# Patient Record
Sex: FEMALE | Race: BLACK OR AFRICAN AMERICAN | NOT HISPANIC OR LATINO | Employment: UNEMPLOYED | URBAN - METROPOLITAN AREA
[De-identification: names, ages, dates, MRNs, and addresses within clinical notes are randomized per-mention and may not be internally consistent; named-entity substitution may affect disease eponyms.]

---

## 2017-04-12 ENCOUNTER — OFFICE VISIT (OUTPATIENT)
Dept: FAMILY MEDICINE | Facility: HOSPITAL | Age: 57
End: 2017-04-12
Payer: MEDICAID

## 2017-04-12 VITALS
HEIGHT: 59 IN | SYSTOLIC BLOOD PRESSURE: 114 MMHG | DIASTOLIC BLOOD PRESSURE: 74 MMHG | WEIGHT: 172.81 LBS | BODY MASS INDEX: 34.84 KG/M2 | HEART RATE: 72 BPM

## 2017-04-12 DIAGNOSIS — I10 ESSENTIAL HYPERTENSION: ICD-10-CM

## 2017-04-12 DIAGNOSIS — Z76.89 ENCOUNTER TO ESTABLISH CARE: Primary | ICD-10-CM

## 2017-04-12 DIAGNOSIS — E11.8 TYPE 2 DIABETES MELLITUS WITH COMPLICATION, WITHOUT LONG-TERM CURRENT USE OF INSULIN: ICD-10-CM

## 2017-04-12 DIAGNOSIS — Z01.818 PREOPERATIVE CLEARANCE: ICD-10-CM

## 2017-04-12 PROCEDURE — 99203 OFFICE O/P NEW LOW 30 MIN: CPT | Performed by: FAMILY MEDICINE

## 2017-04-12 RX ORDER — METOPROLOL TARTRATE 100 MG/1
100 TABLET ORAL 2 TIMES DAILY
COMMUNITY

## 2017-04-12 RX ORDER — NAPROXEN SODIUM 220 MG/1
81 TABLET, FILM COATED ORAL DAILY
COMMUNITY

## 2017-04-12 RX ORDER — LISINOPRIL 40 MG/1
40 TABLET ORAL DAILY
COMMUNITY

## 2017-04-12 RX ORDER — ATORVASTATIN CALCIUM 80 MG/1
80 TABLET, FILM COATED ORAL DAILY
COMMUNITY

## 2017-04-12 RX ORDER — METFORMIN HYDROCHLORIDE 1000 MG/1
1000 TABLET ORAL 2 TIMES DAILY WITH MEALS
COMMUNITY

## 2017-04-12 RX ORDER — NITROGLYCERIN 0.4 MG/1
0.4 TABLET SUBLINGUAL EVERY 5 MIN PRN
COMMUNITY

## 2017-04-12 NOTE — LETTER
April 12, 2017                     [facility], [address]  [place] [address]  Phone: [phone]  Fax: [phone]   Patient: Mily Donnelly   MR Number: [id number]   YOB: 1960   Date of Visit: 4/12/2017     Dear [facility] (Margarito Contreras),     We were unable to offer pre-operative medical clearance for this patient today as we are not aware of the surgical procedure she is scheduled to receive. We would be happy to see her and evaluate again once this information has been determined. Thank you for your assistance.    Sincerely,      Ej Granado MD

## 2017-04-12 NOTE — PROGRESS NOTES
I assume primary medical responsibility for this patient, I have reviewed the case history, findings, diagnosis and treatment plan with the resident and agree that the care is reasonable and necessary. This service has been performed by a resident without the presence of a teaching physician under the primary care exception  Debi Salomon  4/12/2017

## 2017-04-12 NOTE — PROGRESS NOTES
"Subjective:       Patient ID: Mily Donnelly is a 57 y.o. female.    Chief Complaint: Pre-op Exam    HPI   Ms. Donnelly is a 56 yo  woman with PMHx of MI in  s/p stent, CHF with EF ~55%, HTN, DM2, Asthma, and "lung mass" who presents today to establish care and  obtain surgical clearance with referral from [facility]. Much of the pt's history obtained from pt and [facility] EMR. Pt had a heart attack in  and is followed by [facility] Cardiology, last seen on 3/2017 and at that time Plavix and Aldactone were discontinued d/t pt's preserved EF and low NYHA class. Pt previously following with [facility] Pulmonology, last appt was in  after which she was lost to follow-up. Pt still on Combivent. Prior to now, pt's PCP was Mary Carmen Calloway at [email or web address], however pt states that upon calling to make this appt, she was notified that Dr. Calloway is no longer her PCP, that it is Dr. Bermudez instead. Last month, pt's cardiologist referred her to [facility]NT d/t 1 year of intermittent voice hoarseness. Saw them on 4/3/2017 and CT Neck obtained on  demonstrated thickening of the vocal chords and mandibular cyst. Pt is scheduled to follow up with them this Monday, . Her MET score is > 4.  Pt has a 40-pack year hx, had quit for a year following her MI, however started back. She currently smokes 1 ppd though she states she is interested in resources to help her quit. She drinks 1 beer per week, denies any drug use. 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 She does not exercise and eats a typical [place] diet. Is sexually active with one man, denies use of protection.  Past family hx significant for DM & MI (Mom, ) and Aneurysm & HTN (Dad, ), no known family hx of CA.     Review of Systems   Constitutional: Negative for chills and fever.   HENT: Negative for rhinorrhea and sore throat.    Eyes: Negative for pain and redness.   Respiratory: " Positive for shortness of breath. Negative for cough.    Cardiovascular: Negative for chest pain and palpitations.   Gastrointestinal: Positive for diarrhea. Negative for abdominal pain, nausea and vomiting.   Endocrine: Negative for polydipsia and polyuria.   Genitourinary: Negative for dysuria and frequency.   Musculoskeletal: Negative for arthralgias and back pain.   Skin: Negative for rash.   Neurological: Positive for numbness. Negative for dizziness, light-headedness and headaches.   Hematological: Negative for adenopathy. Bruises/bleeds easily.   Psychiatric/Behavioral: Negative for confusion. The patient is not nervous/anxious.        Objective:      Vitals:    04/12/17 1319   BP: 114/74   Pulse: 72     Physical Exam   Constitutional: She is oriented to person, place, and time. She appears well-developed and well-nourished. No distress.   HENT:   Head: Normocephalic and atraumatic.   Mouth/Throat: Oropharynx is clear and moist.   Eyes: Conjunctivae and EOM are normal. Pupils are equal, round, and reactive to light.   Neck: Neck supple. No thyromegaly present.   Cardiovascular: Normal rate, regular rhythm and normal heart sounds.    No murmur heard.  Pulmonary/Chest: Effort normal and breath sounds normal. She has no wheezes.   Abdominal: Soft. Bowel sounds are normal. She exhibits no distension. There is no tenderness.   Musculoskeletal: Normal range of motion. She exhibits no edema or tenderness.   Lymphadenopathy:     She has no cervical adenopathy.   Neurological: She is alert and oriented to person, place, and time. No cranial nerve deficit.   Skin: Skin is warm and dry. No erythema.   Psychiatric: She has a normal mood and affect. Her behavior is normal. Thought content normal.       Assessment:       1. Encounter to establish care    2. Essential hypertension    3. Type 2 diabetes mellitus with complication, without long-term current use of insulin    4. Preoperative clearance        Plan:        Encounter to establish care  - Orders in for CBC, CMP, Lipids, and TSH  - Will obtain records from [facility] (previous PCP)    Essential hypertension  - 114/74 in clinic today  - No medication refills needed at this time    Type 2 diabetes mellitus with complication, without long-term current use of insulin  - Orders in for A1c    Preoperative clearance  - Patient with >4 METS with good functional capacity.  Proceed with surgery.    - Will forward EKG to [facility]

## 2017-04-19 ENCOUNTER — HOSPITAL ENCOUNTER (OUTPATIENT)
Dept: CARDIOLOGY | Facility: HOSPITAL | Age: 57
Discharge: HOME | End: 2017-04-19
Attending: FAMILY MEDICINE
Payer: MEDICAID

## 2017-04-19 ENCOUNTER — HOSPITAL ENCOUNTER (OUTPATIENT)
Dept: RADIOLOGY | Facility: HOSPITAL | Age: 57
Discharge: HOME | End: 2017-04-19
Attending: FAMILY MEDICINE
Payer: MEDICAID

## 2017-04-19 DIAGNOSIS — Z01.818 PREOPERATIVE CLEARANCE: ICD-10-CM

## 2017-04-19 PROCEDURE — 71020 XR CHEST PA AND LATERAL: CPT | Mod: TC

## 2017-04-19 PROCEDURE — 71020 XR CHEST PA AND LATERAL: CPT | Mod: 26,,, | Performed by: RADIOLOGY

## 2017-04-19 PROCEDURE — 93005 ELECTROCARDIOGRAM TRACING: CPT

## 2017-04-19 PROCEDURE — 93010 ELECTROCARDIOGRAM REPORT: CPT | Mod: ,,, | Performed by: INTERNAL MEDICINE

## 2017-05-15 ENCOUNTER — TELEPHONE (OUTPATIENT)
Dept: FAMILY MEDICINE | Facility: HOSPITAL | Age: 57
End: 2017-05-15

## 2017-05-15 NOTE — TELEPHONE ENCOUNTER
Received a call from Fouzia Fisher, nurse at [facility]. [facility] patient is scheduled to have a direct laryngoscopy with biopsy on 5/25/17. [facility] patient came in for surgery clearance and they need the clearance and EKG faxed to their office. The phone number to Ms. Fisher is [phone] and the fax is [phone].

## 2017-05-23 NOTE — TELEPHONE ENCOUNTER
Patient's surgery clearance completed by Dr. Granado. Clearance note and EKG faxed to Fouzia Fisher at [phone].

## 2018-01-12 ENCOUNTER — OFFICE VISIT (OUTPATIENT)
Dept: FAMILY MEDICINE | Facility: HOSPITAL | Age: 58
End: 2018-01-12
Payer: MEDICAID

## 2018-01-12 VITALS
DIASTOLIC BLOOD PRESSURE: 84 MMHG | SYSTOLIC BLOOD PRESSURE: 134 MMHG | HEART RATE: 79 BPM | HEIGHT: 59 IN | WEIGHT: 173.94 LBS | BODY MASS INDEX: 35.07 KG/M2

## 2018-01-12 DIAGNOSIS — I10 ESSENTIAL HYPERTENSION: Primary | ICD-10-CM

## 2018-01-12 PROCEDURE — 99213 OFFICE O/P EST LOW 20 MIN: CPT | Performed by: FAMILY MEDICINE

## 2018-01-12 RX ORDER — METOPROLOL SUCCINATE 100 MG/1
TABLET, EXTENDED RELEASE ORAL
COMMUNITY
Start: 2017-11-16

## 2018-01-12 RX ORDER — INSULIN PUMP SYRINGE, 3 ML
EACH MISCELLANEOUS
Qty: 1 EACH | Refills: 0 | Status: SHIPPED | OUTPATIENT
Start: 2018-01-12 | End: 2019-01-12

## 2018-01-22 NOTE — PROGRESS NOTES
Subjective:       Patient ID: Mily Donnelly is a 57 y.o. female.    Chief Complaint: Follow-up    Patient presents today for follow up of her hypertension.  Patient states that she needs a new glucometer.  She does not need refills at this time.  Negative for fever, headache, nausea, vomiting, chest pain, abdominal pain, dysuria, constipation, diarrhea        Review of Systems   Constitutional: Negative for chills and fever.   Respiratory: Negative for shortness of breath.    Cardiovascular: Negative for chest pain.   Gastrointestinal: Negative for abdominal pain, nausea and vomiting.   Neurological: Negative for headaches.       Objective:      Vitals:    01/12/18 1035   BP: 134/84   Pulse: 79     Physical Exam   Constitutional: She is oriented to person, place, and time. She appears well-developed and well-nourished. No distress.   HENT:   Head: Normocephalic and atraumatic.   Right Ear: External ear normal.   Left Ear: External ear normal.   Nose: Nose normal.   Mouth/Throat: Oropharynx is clear and moist. No oropharyngeal exudate.   Eyes: Conjunctivae and EOM are normal. Pupils are equal, round, and reactive to light. Right eye exhibits no discharge. Left eye exhibits no discharge. No scleral icterus.   Neck: Normal range of motion. No JVD present. No thyromegaly present.   Cardiovascular: Normal rate, regular rhythm, normal heart sounds and intact distal pulses.  Exam reveals no gallop and no friction rub.    No murmur heard.  Pulmonary/Chest: Effort normal and breath sounds normal. No respiratory distress. She has no wheezes. She has no rales. She exhibits no tenderness.   Abdominal: Soft. Bowel sounds are normal. She exhibits no distension and no mass. There is no tenderness. There is no rebound and no guarding. No hernia.   Musculoskeletal: Normal range of motion.   Neurological: She is alert and oriented to person, place, and time. No cranial nerve deficit.   Skin: Skin is warm and dry. She is not  diaphoretic.   Psychiatric: She has a normal mood and affect. Her behavior is normal.   Vitals reviewed.      Assessment:       1. Essential hypertension        Plan:       Essential hypertension  -     CBC auto differential; Future; Expected date: 01/12/2018  -     Comprehensive metabolic panel; Future; Expected date: 01/12/2018  -     Hemoglobin A1c; Future; Expected date: 01/12/2018  -     Lipid panel; Future; Expected date: 01/12/2018  -     TSH; Future; Expected date: 01/12/2018  -     blood-glucose meter kit; Use as instructed  Dispense: 1 each; Refill: 0      Follow-up in about 3 months (around 4/12/2018).

## 2018-01-24 NOTE — PROGRESS NOTES
I assume primary medical responsibility for this patient, I have reviewed the case history, findings, diagnosis and treatment plan with the resident and agree that the care is reasonable and necessary. This service has been performed by a resident without the presence of a teaching physician under the primary care exception  Debi Salomon  1/24/2018

## 2019-05-20 DIAGNOSIS — Z12.11 SCREENING FOR COLON CANCER: Primary | ICD-10-CM

## 2019-05-21 ENCOUNTER — LAB VISIT (OUTPATIENT)
Dept: LAB | Facility: HOSPITAL | Age: 59
End: 2019-05-21
Attending: FAMILY MEDICINE
Payer: MEDICAID

## 2019-05-21 DIAGNOSIS — Z12.11 SCREENING FOR COLON CANCER: ICD-10-CM

## 2019-05-21 LAB — HEMOCCULT STL QL IA: NEGATIVE

## 2019-05-21 PROCEDURE — 82274 ASSAY TEST FOR BLOOD FECAL: CPT
